# Patient Record
Sex: MALE | Race: WHITE | NOT HISPANIC OR LATINO | Employment: FULL TIME | URBAN - METROPOLITAN AREA
[De-identification: names, ages, dates, MRNs, and addresses within clinical notes are randomized per-mention and may not be internally consistent; named-entity substitution may affect disease eponyms.]

---

## 2017-02-27 ENCOUNTER — ALLSCRIPTS OFFICE VISIT (OUTPATIENT)
Dept: OTHER | Facility: OTHER | Age: 37
End: 2017-02-27

## 2018-01-14 VITALS
RESPIRATION RATE: 20 BRPM | SYSTOLIC BLOOD PRESSURE: 122 MMHG | HEIGHT: 68 IN | DIASTOLIC BLOOD PRESSURE: 72 MMHG | HEART RATE: 98 BPM | OXYGEN SATURATION: 95 % | TEMPERATURE: 99.4 F | BODY MASS INDEX: 32.79 KG/M2 | WEIGHT: 216.38 LBS

## 2018-04-11 DIAGNOSIS — E66.9 OBESITY (BMI 30.0-34.9): Primary | ICD-10-CM

## 2018-04-11 RX ORDER — PHENTERMINE HYDROCHLORIDE 37.5 MG/1
37.5 TABLET ORAL DAILY
Qty: 30 TABLET | Refills: 2 | Status: SHIPPED | OUTPATIENT
Start: 2018-04-11 | End: 2019-01-04 | Stop reason: ALTCHOICE

## 2018-04-11 RX ORDER — PHENTERMINE HYDROCHLORIDE 37.5 MG/1
1 TABLET ORAL DAILY
COMMUNITY
Start: 2016-05-04 | End: 2018-04-11 | Stop reason: SDUPTHER

## 2019-01-04 ENCOUNTER — OFFICE VISIT (OUTPATIENT)
Dept: FAMILY MEDICINE CLINIC | Facility: CLINIC | Age: 39
End: 2019-01-04
Payer: COMMERCIAL

## 2019-01-04 VITALS
HEART RATE: 92 BPM | WEIGHT: 231 LBS | RESPIRATION RATE: 16 BRPM | SYSTOLIC BLOOD PRESSURE: 150 MMHG | TEMPERATURE: 99.1 F | HEIGHT: 67 IN | BODY MASS INDEX: 36.26 KG/M2 | DIASTOLIC BLOOD PRESSURE: 100 MMHG

## 2019-01-04 DIAGNOSIS — Z13.220 SCREENING FOR HYPERLIPIDEMIA: ICD-10-CM

## 2019-01-04 DIAGNOSIS — Z13.1 SCREENING FOR DIABETES MELLITUS: ICD-10-CM

## 2019-01-04 DIAGNOSIS — Z13.0 SCREENING FOR DEFICIENCY ANEMIA: ICD-10-CM

## 2019-01-04 DIAGNOSIS — Z13.29 SCREENING FOR THYROID DISORDER: ICD-10-CM

## 2019-01-04 DIAGNOSIS — E66.9 OBESITY (BMI 30.0-34.9): Primary | ICD-10-CM

## 2019-01-04 DIAGNOSIS — E55.9 VITAMIN D DEFICIENCY: ICD-10-CM

## 2019-01-04 PROBLEM — E66.811 OBESITY (BMI 30.0-34.9): Status: ACTIVE | Noted: 2019-01-04

## 2019-01-04 PROCEDURE — 3008F BODY MASS INDEX DOCD: CPT | Performed by: PHYSICIAN ASSISTANT

## 2019-01-04 PROCEDURE — 1036F TOBACCO NON-USER: CPT | Performed by: PHYSICIAN ASSISTANT

## 2019-01-04 PROCEDURE — 99213 OFFICE O/P EST LOW 20 MIN: CPT | Performed by: PHYSICIAN ASSISTANT

## 2019-01-04 RX ORDER — PHENTERMINE HYDROCHLORIDE 37.5 MG/1
37.5 TABLET ORAL DAILY
Qty: 30 TABLET | Refills: 3 | Status: SHIPPED | OUTPATIENT
Start: 2019-01-04 | End: 2020-11-19

## 2019-01-04 NOTE — ASSESSMENT & PLAN NOTE
Will restart phentermine to assist in weight loss efforts  Pt counseled on short term use of this  Update labs

## 2019-01-04 NOTE — PROGRESS NOTES
Assessment/Plan:    Obesity (BMI 30 0-34  9)  Will restart phentermine to assist in weight loss efforts  Pt counseled on short term use of this  Update labs  Diagnoses and all orders for this visit:    Obesity (BMI 30 0-34 9)  -     phentermine (ADIPEX-P) 37 5 MG tablet; Take 1 tablet (37 5 mg total) by mouth daily    Screening for deficiency anemia  -     CBC and differential; Future    Screening for diabetes mellitus  -     Comprehensive metabolic panel; Future    Screening for hyperlipidemia  -     Lipid panel; Future    Screening for thyroid disorder  -     TSH, 3rd generation with Free T4 reflex; Future    Vitamin D deficiency  -     Vitamin D 25 hydroxy; Future          Subjective:      Patient ID: Bill Tapia is a 42 y o  male  Pt presents for routine visit  He is doing well overall  His BP is elevated and he attributes this to a recent 30lb weight gain  He is interested in restarting phentermine that he recently had success with  He is due for labs  He denies any symptoms of his elevated blood pressure such as headache, chest pain or vision change  The following portions of the patient's history were reviewed and updated as appropriate: allergies, current medications, past family history, past medical history, past social history, past surgical history and problem list     Review of Systems   Constitutional: Negative for chills and fever  HENT: Negative for congestion, ear pain, hearing loss, postnasal drip, rhinorrhea, sinus pain, sinus pressure, sore throat and trouble swallowing  Eyes: Negative for pain and visual disturbance  Respiratory: Negative for cough, chest tightness, shortness of breath and wheezing  Cardiovascular: Negative  Negative for chest pain, palpitations and leg swelling  Gastrointestinal: Negative for abdominal pain, blood in stool, constipation, diarrhea, nausea and vomiting     Endocrine: Negative for cold intolerance, heat intolerance, polydipsia, polyphagia and polyuria  Genitourinary: Negative for difficulty urinating, dysuria, flank pain and urgency  Musculoskeletal: Negative for arthralgias, back pain, gait problem and myalgias  Skin: Negative for rash  Allergic/Immunologic: Negative  Neurological: Negative for dizziness, weakness, light-headedness and headaches  Hematological: Negative  Psychiatric/Behavioral: Negative for behavioral problems, dysphoric mood and sleep disturbance  The patient is not nervous/anxious  Objective:      /100 (BP Location: Left arm, Patient Position: Sitting, Cuff Size: Large)   Pulse 92   Temp 99 1 °F (37 3 °C) (Tympanic)   Resp 16   Ht 5' 7" (1 702 m)   Wt 105 kg (231 lb)   BMI 36 18 kg/m²          Physical Exam   Constitutional: He is oriented to person, place, and time  He appears well-developed and well-nourished  No distress  HENT:   Head: Normocephalic and atraumatic  Right Ear: External ear normal    Left Ear: External ear normal    Nose: Nose normal    Mouth/Throat: Oropharynx is clear and moist  No oropharyngeal exudate  Eyes: Pupils are equal, round, and reactive to light  Conjunctivae and EOM are normal  Right eye exhibits no discharge  Left eye exhibits no discharge  No scleral icterus  Neck: Normal range of motion  Neck supple  No thyromegaly present  Cardiovascular: Normal rate, regular rhythm and normal heart sounds  Exam reveals no gallop and no friction rub  No murmur heard  Pulmonary/Chest: Effort normal and breath sounds normal  No respiratory distress  He has no wheezes  He has no rales  Abdominal: Soft  Bowel sounds are normal  He exhibits no distension  There is no tenderness  Musculoskeletal: Normal range of motion  He exhibits no edema, tenderness or deformity  Neurological: He is alert and oriented to person, place, and time  No cranial nerve deficit  Skin: Skin is warm and dry  He is not diaphoretic     Psychiatric: He has a normal mood and affect   His behavior is normal  Judgment and thought content normal

## 2019-07-24 ENCOUNTER — TELEPHONE (OUTPATIENT)
Dept: INTERNAL MEDICINE CLINIC | Facility: CLINIC | Age: 39
End: 2019-07-24

## 2020-11-19 ENCOUNTER — TELEMEDICINE (OUTPATIENT)
Dept: INTERNAL MEDICINE CLINIC | Facility: CLINIC | Age: 40
End: 2020-11-19
Payer: COMMERCIAL

## 2020-11-19 DIAGNOSIS — E55.9 VITAMIN D DEFICIENCY: ICD-10-CM

## 2020-11-19 DIAGNOSIS — Z13.29 SCREENING FOR THYROID DISORDER: ICD-10-CM

## 2020-11-19 DIAGNOSIS — Z13.220 SCREENING, LIPID: ICD-10-CM

## 2020-11-19 DIAGNOSIS — Z13.0 SCREENING FOR DEFICIENCY ANEMIA: ICD-10-CM

## 2020-11-19 DIAGNOSIS — F41.1 GAD (GENERALIZED ANXIETY DISORDER): ICD-10-CM

## 2020-11-19 DIAGNOSIS — Z13.1 SCREENING FOR DIABETES MELLITUS: ICD-10-CM

## 2020-11-19 DIAGNOSIS — I10 ESSENTIAL HYPERTENSION: Primary | ICD-10-CM

## 2020-11-19 PROCEDURE — 1036F TOBACCO NON-USER: CPT | Performed by: PHYSICIAN ASSISTANT

## 2020-11-19 PROCEDURE — 99214 OFFICE O/P EST MOD 30 MIN: CPT | Performed by: PHYSICIAN ASSISTANT

## 2020-11-19 PROCEDURE — 3725F SCREEN DEPRESSION PERFORMED: CPT | Performed by: PHYSICIAN ASSISTANT

## 2020-11-19 RX ORDER — ALPRAZOLAM 0.5 MG/1
0.5 TABLET ORAL 2 TIMES DAILY PRN
Qty: 60 TABLET | Refills: 1 | Status: SHIPPED | OUTPATIENT
Start: 2020-11-19 | End: 2021-01-18 | Stop reason: SDUPTHER

## 2020-11-19 RX ORDER — LISINOPRIL 5 MG/1
5 TABLET ORAL DAILY
Qty: 90 TABLET | Refills: 3 | Status: SHIPPED | OUTPATIENT
Start: 2020-11-19 | End: 2021-01-18 | Stop reason: SDUPTHER

## 2020-11-19 RX ORDER — LISINOPRIL 5 MG/1
5 TABLET ORAL DAILY
Qty: 90 TABLET | Refills: 3 | Status: SHIPPED | OUTPATIENT
Start: 2020-11-19 | End: 2020-11-19 | Stop reason: SDUPTHER

## 2021-01-18 ENCOUNTER — TELEPHONE (OUTPATIENT)
Dept: INTERNAL MEDICINE CLINIC | Facility: CLINIC | Age: 41
End: 2021-01-18

## 2021-01-18 ENCOUNTER — TELEMEDICINE (OUTPATIENT)
Dept: INTERNAL MEDICINE CLINIC | Facility: CLINIC | Age: 41
End: 2021-01-18
Payer: COMMERCIAL

## 2021-01-18 VITALS
TEMPERATURE: 100.4 F | SYSTOLIC BLOOD PRESSURE: 144 MMHG | HEART RATE: 85 BPM | BODY MASS INDEX: 35.31 KG/M2 | WEIGHT: 225 LBS | HEIGHT: 67 IN | DIASTOLIC BLOOD PRESSURE: 107 MMHG

## 2021-01-18 DIAGNOSIS — I10 ESSENTIAL HYPERTENSION: ICD-10-CM

## 2021-01-18 DIAGNOSIS — F41.1 GAD (GENERALIZED ANXIETY DISORDER): ICD-10-CM

## 2021-01-18 DIAGNOSIS — U07.1 COVID-19: Primary | ICD-10-CM

## 2021-01-18 PROCEDURE — 99214 OFFICE O/P EST MOD 30 MIN: CPT | Performed by: PHYSICIAN ASSISTANT

## 2021-01-18 RX ORDER — LISINOPRIL 10 MG/1
10 TABLET ORAL DAILY
Qty: 30 TABLET | Refills: 2 | Status: SHIPPED | OUTPATIENT
Start: 2021-01-18 | End: 2021-02-09

## 2021-01-18 RX ORDER — ALBUTEROL SULFATE 90 UG/1
3 AEROSOL, METERED RESPIRATORY (INHALATION) ONCE AS NEEDED
Status: CANCELLED | OUTPATIENT
Start: 2021-01-20

## 2021-01-18 RX ORDER — ACETAMINOPHEN 325 MG/1
650 TABLET ORAL ONCE AS NEEDED
Status: CANCELLED | OUTPATIENT
Start: 2021-01-20

## 2021-01-18 RX ORDER — ALPRAZOLAM 0.5 MG/1
0.5 TABLET ORAL 2 TIMES DAILY PRN
Qty: 60 TABLET | Refills: 1 | Status: SHIPPED | OUTPATIENT
Start: 2021-01-18 | End: 2021-03-15 | Stop reason: SDUPTHER

## 2021-01-18 RX ORDER — SODIUM CHLORIDE 9 MG/ML
20 INJECTION, SOLUTION INTRAVENOUS ONCE
Status: CANCELLED | OUTPATIENT
Start: 2021-01-20

## 2021-01-18 NOTE — PROGRESS NOTES
COVID-19 Virtual Visit     Assessment/Plan:    Problem List Items Addressed This Visit        Cardiovascular and Mediastinum    Essential hypertension    Relevant Medications    lisinopril (ZESTRIL) 10 mg tablet      Other Visit Diagnoses     COVID-19    -  Primary    LATIA (generalized anxiety disorder)        Relevant Medications    ALPRAZolam (XANAX) 0 5 mg tablet         Disposition:     I recommended continued isolation until at least 24 hours have passed since recovery defined as resolution of fever without the use of fever-reducing medications AND improvement in COVID symptoms AND 10 days have passed since onset of symptoms (or 10 days have passed since date of first positive viral diagnostic test for asymptomatic patients)  Pt was informed to isolate  Avoid any public places  Use one bedroom and one bathroom within the home  [place] masking recommended if they need to be around anyone they share the home with  Clean high touch surfaces frequently  Wash hands frequently  Pt verbalized understanding of this  Patient meets criteria for Casirivimab/Imdevimab infusion  They were counseled in regards to risks, benefits, and side effects of this infusion  Casirivimab and imdevimab are investigational medicines used to treat mild to moderate symptoms of COVID-19 in non-hospitalized adults and adolescents (15years of age and older who weigh at least 80 pounds (40 kg)), and who are at high risk for developing severe COVID-19 symptoms or the need for hospitalization  Casirivimab and imdevimab are investigational because they are still being studied  There is limited information known about the safety and effectiveness of using casirivimab and imdevimab to treat people with COVID-19  The FDA has authorized the emergency use of casirivimab and imdevimab for the treatment of COVID-19 under an Emergency Use Authorization (EUA)  Possible side effects of casirivimab and imdevimab:     Allergic reactions can happen during and after infusion with casirivimab and imdevimab  Possible reactions include: fever, chills, nausea, headache, shortness of breath, low blood pressure, wheezing, swelling of your lips, face, or throat, rash including hives, itching, muscle aches, and dizziness  The side effects of getting any medicine by vein may include brief pain, bleeding, bruising of the skin, soreness, swelling, and possible infection at the infusion site  These are not all the possible side effects of casirivimab and imdevimab  Not a lot of people have been given casirivimab and imdevimab  Serious and unexpected side effects may happen  Casirivimab and imdevimab are still being studied so it is possible that all of the risks are not known at this time  It is possible that casirivimab and imdevimab could interfere with your body's own ability to fight off a future infection of SARS-CoV-2  Similarly, casirivimab and imdevimab may reduce your body's immune response to a vaccine for SARS-CoV-2  Specific studies have not been conducted to address these possible risks  Emergency Use Authorization:    The [facility] FDA has made casirivimab and imdevimab available under an emergency access mechanism called an EUA  The EUA is supported by a [place] of Health and Human Service (HHS) declaration that circumstances exist to justify the emergency use of drugs and biological products during the COVID-19 pandemic  Casirivimab and imdevimab have not undergone the same type of review as an FDA-approved or cleared product  The FDA may issue an EUA when certain criteria are met, which includes that there are no adequate, approved, available alternatives   In addition, the FDA decision is based on the totality of scientific evidence available showing that it is reasonable to believe that the product meets certain criteria for safety, performance, and labeling and may be effective in treatment of patients during the COVID-19 pandemic  All of these criteria must be met to allow for the product to be used in the treatment of patients during the COVID-19 pandemic  The EUA for casirivimab and imdevimab is in effect for the duration of the COVID-19 declaration justifying emergency use of these products, unless terminated or revoked (after which the products may no longer be used)  Regarding COVID-19 Vaccination:    Currently there is no data or safety or efficacy of COVID-19 vaccination in persons who received monoclonal antibodies as part of COVID-19 treatment  Treatment should be deferred for at least 90 days to avoid interference of the treatment with vaccine-induced immune responses (this is based on estimated half-life of therapies and evidence suggesting reinfection is uncommon within 90 days of initial infection)  The patient consents to proceed with casirivimab and imdevimab infusion  I have spent 15 minutes directly with the patient  Greater than 50% of this time was spent in counseling/coordination of care regarding: prognosis, risks and benefits of treatment options, instructions for management, patient and family education, importance of treatment compliance, risk factor reductions and impressions  Encounter provider Ludmila Rivera PA-C    Provider located at [address] [place] [phone]    Recent Visits  No visits were found meeting these conditions  Showing recent visits within past 7 days and meeting all other requirements     Today's Visits  Date Type Provider Dept   01/18/21 Telemedicine [facility] today's visits and meeting all other requirements     Future Appointments  No visits were found meeting these conditions     Showing future appointments within next 150 days and meeting all other requirements      This virtual check-in was done via [employer] and patient was informed that this is a secure, HIPAA-compliant platform  He agrees to proceed  Patient agrees to participate in a virtual check in via telephone or video visit instead of presenting to the office to address urgent/immediate medical needs  Patient is aware this is a billable service  After connecting through [facility], the patient was identified by name and date of birth  Omaira Banks was informed that this was a telemedicine visit and that the exam was being conducted confidentially over secure lines  Omaira Banks acknowledged consent and understanding of privacy and security of the telemedicine visit  I informed the patient that I have reviewed his record in Epic and presented the opportunity for him to ask any questions regarding the visit today  The patient agreed to participate  Subjective:   Omaira Banks is a 46 y o  male who has been screened for COVID-19  Symptom change since last report: unchanged  Patient's symptoms include fever, chills, nasal congestion, cough and myalgias  Patient denies rhinorrhea, sore throat, shortness of breath, chest tightness, abdominal pain, nausea, vomiting, diarrhea and headaches  Pawan Hough has been staying home and has isolated themselves in his home  He is taking care to not share personal items and is cleaning all surfaces that are touched often, like counters, tabletops, and doorknobs using household cleaning sprays or wipes  He is wearing a mask when he leaves his room       Date of symptom onset: 1/18/2021  Date of positive COVID-19 PCR: 1/18/2021    No results found for: Coleman V[place]la, [facility]  Past Medical History:   Diagnosis Date    Hypertension      Past Surgical History:   Procedure Laterality Date    NEUROPLASTY / TRANSPOSITION MEDIAN NERVE AT CARPAL TUNNEL      SHOULDER ARTHROPLASTY Right 10/2016     Current Outpatient Medications   Medication Sig Dispense Refill    ALPRAZolam (XANAX) 0 5 mg tablet Take 1 tablet (0 5 mg total) by mouth 2 (two) times a day as needed for anxiety 60 tablet 1    lisinopril (ZESTRIL) 10 mg tablet Take 1 tablet (10 mg total) by mouth daily 30 tablet 2     No current facility-administered medications for this visit  No Known Allergies    Review of Systems   Constitutional: Positive for chills and fever  HENT: Positive for congestion  Negative for ear pain, hearing loss, postnasal drip, rhinorrhea, sinus pressure, sinus pain, sore throat and trouble swallowing  Eyes: Negative for pain and visual disturbance  Respiratory: Positive for cough  Negative for chest tightness, shortness of breath and wheezing  Cardiovascular: Negative  Negative for chest pain, palpitations and leg swelling  Gastrointestinal: Negative for abdominal pain, blood in stool, constipation, diarrhea, nausea and vomiting  Endocrine: Negative for cold intolerance, heat intolerance, polydipsia, polyphagia and polyuria  Genitourinary: Negative for difficulty urinating, dysuria, flank pain and urgency  Musculoskeletal: Positive for myalgias  Negative for arthralgias, back pain and gait problem  Skin: Negative for rash  Allergic/Immunologic: Negative  Neurological: Negative for dizziness, weakness, light-headedness and headaches  Hematological: Negative  Psychiatric/Behavioral: Negative for behavioral problems, dysphoric mood and sleep disturbance  The patient is not nervous/anxious  Objective:    Vitals:    01/18/21 1316   BP: (!) 144/107   Pulse: 85   Temp: 100 4 °F (38 °C)   Weight: 102 kg (225 lb)   Height: 5' 7" (1 702 m)       Physical Exam  Constitutional:       Appearance: He is well-developed  He is ill-appearing  HENT:      Head: Normocephalic and atraumatic  Nose: Nose normal    Eyes:      Conjunctiva/sclera: Conjunctivae normal    Neck:      Musculoskeletal: Normal range of motion  Pulmonary:      Effort: Pulmonary effort is normal    Musculoskeletal: Normal range of motion  Neurological:      Mental Status: He is alert and oriented to person, place, and time  Psychiatric:         Behavior: Behavior normal          Thought Content: Thought content normal          Judgment: Judgment normal        VIRTUAL VISIT DISCLAIMER    Diana Chacorta acknowledges that he has consented to an online visit or consultation  He understands that the online visit is based solely on information provided by him, and that, in the absence of a face-to-face physical evaluation by the physician, the diagnosis he receives is both limited and provisional in terms of accuracy and completeness  This is not intended to replace a full medical face-to-face evaluation by the physician  Diana Whatley understands and accepts these terms

## 2021-01-20 ENCOUNTER — TELEMEDICINE (OUTPATIENT)
Dept: INTERNAL MEDICINE CLINIC | Facility: CLINIC | Age: 41
End: 2021-01-20
Payer: COMMERCIAL

## 2021-01-20 ENCOUNTER — HOSPITAL ENCOUNTER (OUTPATIENT)
Dept: INFUSION CENTER | Facility: HOSPITAL | Age: 41
Discharge: HOME | End: 2021-01-20
Payer: COMMERCIAL

## 2021-01-20 VITALS
HEIGHT: 67 IN | TEMPERATURE: 98.4 F | WEIGHT: 225 LBS | SYSTOLIC BLOOD PRESSURE: 126 MMHG | DIASTOLIC BLOOD PRESSURE: 86 MMHG | HEART RATE: 96 BPM | BODY MASS INDEX: 35.31 KG/M2

## 2021-01-20 VITALS
DIASTOLIC BLOOD PRESSURE: 80 MMHG | SYSTOLIC BLOOD PRESSURE: 125 MMHG | OXYGEN SATURATION: 96 % | TEMPERATURE: 98.2 F | HEART RATE: 74 BPM | RESPIRATION RATE: 18 BRPM

## 2021-01-20 DIAGNOSIS — U07.1 COVID-19: Primary | ICD-10-CM

## 2021-01-20 PROCEDURE — M0243 CASIRIVI AND IMDEVI INFUSION: HCPCS

## 2021-01-20 PROCEDURE — 99213 OFFICE O/P EST LOW 20 MIN: CPT | Performed by: PHYSICIAN ASSISTANT

## 2021-01-20 RX ORDER — ACETAMINOPHEN 325 MG/1
650 TABLET ORAL ONCE AS NEEDED
Status: DISCONTINUED | OUTPATIENT
Start: 2021-01-20 | End: 2021-01-23 | Stop reason: HOSPADM

## 2021-01-20 RX ORDER — ACETAMINOPHEN 325 MG/1
650 TABLET ORAL ONCE AS NEEDED
Status: CANCELLED | OUTPATIENT
Start: 2021-01-20

## 2021-01-20 RX ORDER — SODIUM CHLORIDE 9 MG/ML
20 INJECTION, SOLUTION INTRAVENOUS ONCE
Status: CANCELLED | OUTPATIENT
Start: 2021-01-20

## 2021-01-20 RX ORDER — SODIUM CHLORIDE 9 MG/ML
20 INJECTION, SOLUTION INTRAVENOUS ONCE
Status: COMPLETED | OUTPATIENT
Start: 2021-01-20 | End: 2021-01-20

## 2021-01-20 RX ORDER — ALBUTEROL SULFATE 90 UG/1
3 AEROSOL, METERED RESPIRATORY (INHALATION) ONCE AS NEEDED
Status: CANCELLED | OUTPATIENT
Start: 2021-01-20

## 2021-01-20 RX ORDER — ALBUTEROL SULFATE 90 UG/1
3 AEROSOL, METERED RESPIRATORY (INHALATION) ONCE AS NEEDED
Status: DISCONTINUED | OUTPATIENT
Start: 2021-01-20 | End: 2021-01-23 | Stop reason: HOSPADM

## 2021-01-20 RX ADMIN — SODIUM CHLORIDE 20 ML/HR: 9 INJECTION, SOLUTION INTRAVENOUS at 11:00

## 2021-01-20 RX ADMIN — IMDEVIMAB: 1332 INJECTION, SOLUTION, CONCENTRATE INTRAVENOUS at 11:15

## 2021-01-20 NOTE — PROGRESS NOTES
Patient tolerated Regeneron (Carsirivimab and Imdevimab) infusion and 1 hour post observation without incident  IV discontinued, catheter intact  Gauze applied to site  Discharge instructions reviewed with patient verbally  Copy of discharge instructions given to patient  Patient verbalized understanding of all discharge instructions  Patient discharged without incident

## 2021-01-20 NOTE — PROGRESS NOTES
COVID-19 Virtual Visit     Assessment/Plan:    Problem List Items Addressed This Visit        Other    COVID-19 - Primary         Disposition:     I recommended continued isolation until at least 24 hours have passed since recovery defined as resolution of fever without the use of fever-reducing medications AND improvement in COVID symptoms AND 10 days have passed since onset of symptoms (or 10 days have passed since date of first positive viral diagnostic test for asymptomatic patients)  I have spent 15 minutes directly with the patient  Greater than 50% of this time was spent in counseling/coordination of care regarding: prognosis, risks and benefits of treatment options, instructions for management, patient and family education, importance of treatment compliance, risk factor reductions and impressions  Encounter provider Dalia Sagastume PA-C    Provider located at [address] [place] [phone]    Recent Visits  Date Type Provider Dept   01/18/21 Telephone Jose Rafael [place], [facility]   01/18/21 [facility]   Showing recent visits within past 7 days and meeting all other requirements     Today's Visits  Date Type Provider Dept   01/20/21 Telemedicine Mica Verduzco PA-C [facility] today's visits and meeting all other requirements     Future Appointments  No visits were found meeting these conditions  Showing future appointments within next 150 days and meeting all other requirements      This virtual check-in was done via [employer] and patient was informed that this is a secure, HIPAA-compliant platform  He agrees to proceed  Patient agrees to participate in a virtual check in via telephone or video visit instead of presenting to the office to address urgent/immediate medical needs  Patient is aware this is a billable service      After connecting through [employer], the patient was identified by name and date of birth  Evan Jones was informed that this was a telemedicine visit and that the exam was being conducted confidentially over secure lines  Evan Jones acknowledged consent and understanding of privacy and security of the telemedicine visit  I informed the patient that I have reviewed his record in Epic and presented the opportunity for him to ask any questions regarding the visit today  The patient agreed to participate  Subjective:   Evan Jones is a 46 y o  male who has been screened for COVID-19  Symptom change since last report: unchanged  Patient's symptoms include nasal congestion, anosmia and cough  Patient denies fever, chills, rhinorrhea, sore throat, shortness of breath, chest tightness, abdominal pain, nausea, vomiting, diarrhea, myalgias and headaches  Michelle Carpio has been staying home and has isolated themselves in his home  He is taking care to not share personal items and is cleaning all surfaces that are touched often, like counters, tabletops, and doorknobs using household cleaning sprays or wipes  He is wearing a mask when he leaves his room       Date of symptom onset: 1/18/2021  Date of positive COVID-19 PCR: 1/18/2021    Monoclonal Antibody Follow-up Symptom Questionnaire  I feel overall: somewhat better  My breathing is: somewhat better  My fever is: better  My fatigue is: somewhat better    No results found for: SARSCOV2, [address] [facility], SARSCORONAVI, CORONAVIRUSR  Past Medical History:   Diagnosis Date    Hypertension      Past Surgical History:   Procedure Laterality Date    NEUROPLASTY / TRANSPOSITION MEDIAN NERVE AT CARPAL TUNNEL      SHOULDER ARTHROPLASTY Right 10/2016     Current Outpatient Medications   Medication Sig Dispense Refill    ALPRAZolam (XANAX) 0 5 mg tablet Take 1 tablet (0 5 mg total) by mouth 2 (two) times a day as needed for anxiety 60 tablet 1    lisinopril (ZESTRIL) 10 mg tablet Take 1 tablet (10 mg total) by mouth daily 30 tablet 2     No current facility-administered medications for this visit  Facility-Administered Medications Ordered in Other Visits   Medication Dose Route Frequency Provider Last Rate Last Admin    acetaminophen (TYLENOL) tablet 650 mg  650 mg Oral Once PRN Donnald Lux, DO        albuterol (PROVENTIL HFA,VENTOLIN HFA) inhaler 3 puff  3 puff Inhalation Once PRN Donnald Lux, DO         No Known Allergies    Review of Systems   Constitutional: Negative for chills and fever  HENT: Positive for congestion  Negative for ear pain, hearing loss, postnasal drip, rhinorrhea, sinus pressure, sinus pain, sore throat and trouble swallowing  Eyes: Negative for pain and visual disturbance  Respiratory: Positive for cough  Negative for chest tightness, shortness of breath and wheezing  Cardiovascular: Negative  Negative for chest pain, palpitations and leg swelling  Gastrointestinal: Negative for abdominal pain, blood in stool, constipation, diarrhea, nausea and vomiting  Endocrine: Negative for cold intolerance, heat intolerance, polydipsia, polyphagia and polyuria  Genitourinary: Negative for difficulty urinating, dysuria, flank pain and urgency  Musculoskeletal: Negative for arthralgias, back pain, gait problem and myalgias  Skin: Negative for rash  Allergic/Immunologic: Negative  Neurological: Negative for dizziness, weakness, light-headedness and headaches  Hematological: Negative  Psychiatric/Behavioral: Negative for behavioral problems, dysphoric mood and sleep disturbance  The patient is not nervous/anxious  Objective:    Vitals:    01/20/21 1524   BP: 126/86   Pulse: 96   Temp: 98 4 °F (36 9 °C)   Weight: 102 kg (225 lb)   Height: 5' 7" (1 702 m)       Physical Exam  Constitutional:       Appearance: He is well-developed  HENT:      Head: Normocephalic and atraumatic        Nose: Nose normal    Eyes:      Conjunctiva/sclera: Conjunctivae normal    Neck:      Musculoskeletal: Normal range of motion  Pulmonary:      Effort: Pulmonary effort is normal    Musculoskeletal: Normal range of motion  Neurological:      Mental Status: He is alert and oriented to person, place, and time  Psychiatric:         Behavior: Behavior normal          Thought Content: Thought content normal          Judgment: Judgment normal        VIRTUAL VISIT DISCLAIMER    Diana Chacorta acknowledges that he has consented to an online visit or consultation  He understands that the online visit is based solely on information provided by him, and that, in the absence of a face-to-face physical evaluation by the physician, the diagnosis he receives is both limited and provisional in terms of accuracy and completeness  This is not intended to replace a full medical face-to-face evaluation by the physician  Diana Whatley understands and accepts these terms

## 2021-01-20 NOTE — PROGRESS NOTES
Patient is here today for their Bamlanivimab infusion  Reviewed EUA with patient  Patient verbalized understanding of EUA  Copy of EUA given to patient  All questions answered to patients satisfaction  IV started, good blood return, flushes freely  Patient tolerated well

## 2021-01-22 ENCOUNTER — TELEMEDICINE (OUTPATIENT)
Dept: INTERNAL MEDICINE CLINIC | Facility: CLINIC | Age: 41
End: 2021-01-22
Payer: COMMERCIAL

## 2021-01-22 VITALS — HEART RATE: 84 BPM | OXYGEN SATURATION: 98 % | TEMPERATURE: 98.4 F

## 2021-01-22 DIAGNOSIS — U07.1 COVID-19: Primary | ICD-10-CM

## 2021-01-22 PROCEDURE — 99213 OFFICE O/P EST LOW 20 MIN: CPT | Performed by: PHYSICIAN ASSISTANT

## 2021-01-22 NOTE — PROGRESS NOTES
COVID-19 Virtual Visit     Assessment/Plan:    Problem List Items Addressed This Visit        Other    COVID-19 - Primary         Disposition:     I recommended continued isolation until at least 24 hours have passed since recovery defined as resolution of fever without the use of fever-reducing medications AND improvement in COVID symptoms AND 10 days have passed since onset of symptoms (or 10 days have passed since date of first positive viral diagnostic test for asymptomatic patients)  I have spent 15 minutes directly with the patient  Greater than 50% of this time was spent in counseling/coordination of care regarding: prognosis, risks and benefits of treatment options, instructions for management, patient and family education, importance of treatment compliance, risk factor reductions and impressions  Encounter provider Cherelle Martell PA-C    Provider located at [address] [place] [phone]    Recent Visits  Date Type Provider Dept   01/20/21 Telemedicine Cherelle Martell PA-C Pg [facility]   01/18/21 Telephone Vikas Riojas, [address] [place] [address] [facility]   01/18/21 Telemedicine Mica Verduzco PA-C [facility]   Showing recent visits within past 7 days and meeting all other requirements     Today's Visits  Date Type Provider Dept   01/22/21 Telemedicine LUL Pearson Pg today's visits and meeting all other requirements     Future Appointments  No visits were found meeting these conditions  Showing future appointments within next 150 days and meeting all other requirements      This virtual check-in was done via [employer] and patient was informed that this is a secure, HIPAA-compliant platform  He agrees to proceed  Patient agrees to participate in a virtual check in via telephone or video visit instead of presenting to the office to address urgent/immediate medical needs  Patient is aware this is a billable service  After connecting through [facility], the patient was identified by name and date of birth  Drucella Gilford was informed that this was a telemedicine visit and that the exam was being conducted confidentially over secure lines  Drucella Gilford acknowledged consent and understanding of privacy and security of the telemedicine visit  I informed the patient that I have reviewed his record in Epic and presented the opportunity for him to ask any questions regarding the visit today  The patient agreed to participate  Subjective:   Drucella Gilford is a 46 y o  male who has been screened for COVID-19  Symptom change since last report: improving  Patient's symptoms include cough  Patient denies fever, chills, fatigue, congestion, rhinorrhea, sore throat, anosmia, loss of taste, shortness of breath, chest tightness, abdominal pain, nausea, vomiting, diarrhea, myalgias and headaches  Aden Wesley has been staying home and has isolated themselves in his home  He is taking care to not share personal items and is cleaning all surfaces that are touched often, like counters, tabletops, and doorknobs using household cleaning sprays or wipes  He is wearing a mask when he leaves his room       Date of symptom onset: 1/18/2021    Monoclonal Antibody Follow-up Symptom Questionnaire  I feel overall: much better  My breathing is: much better  My fever is: better  My fatigue is: much better    No results found for: [place] [address], [address] [facility]  Past Medical History:   Diagnosis Date    Hypertension      Past Surgical History:   Procedure Laterality Date    NEUROPLASTY / TRANSPOSITION MEDIAN NERVE AT CARPAL TUNNEL      SHOULDER ARTHROPLASTY Right 10/2016     Current Outpatient Medications   Medication Sig Dispense Refill    ALPRAZolam (XANAX) 0 5 mg tablet Take 1 tablet (0 5 mg total) by mouth 2 (two) times a day as needed for anxiety 60 tablet 1    lisinopril (ZESTRIL) 10 mg tablet Take 1 tablet (10 mg total) by mouth daily 30 tablet 2     No current facility-administered medications for this visit  Facility-Administered Medications Ordered in Other Visits   Medication Dose Route Frequency Provider Last Rate Last Admin    acetaminophen (TYLENOL) tablet 650 mg  650 mg Oral Once PRN Nicky Lobo,         albuterol (PROVENTIL HFA,VENTOLIN HFA) inhaler 3 puff  3 puff Inhalation Once PRN Nicky Lobo, DO         No Known Allergies    Review of Systems   Constitutional: Negative for chills, fatigue and fever  HENT: Negative for congestion, ear pain, hearing loss, postnasal drip, rhinorrhea, sinus pressure, sinus pain, sore throat and trouble swallowing  Eyes: Negative for pain and visual disturbance  Respiratory: Positive for cough  Negative for chest tightness, shortness of breath and wheezing  Cardiovascular: Negative  Negative for chest pain, palpitations and leg swelling  Gastrointestinal: Negative for abdominal pain, blood in stool, constipation, diarrhea, nausea and vomiting  Endocrine: Negative for cold intolerance, heat intolerance, polydipsia, polyphagia and polyuria  Genitourinary: Negative for difficulty urinating, dysuria, flank pain and urgency  Musculoskeletal: Negative for arthralgias, back pain, gait problem and myalgias  Skin: Negative for rash  Allergic/Immunologic: Negative  Neurological: Negative for dizziness, weakness, light-headedness and headaches  Hematological: Negative  Psychiatric/Behavioral: Negative for behavioral problems, dysphoric mood and sleep disturbance  The patient is not nervous/anxious  Objective:    Vitals:    01/22/21 1522   Pulse: 84   Temp: 98 4 °F (36 9 °C)   SpO2: 98%       Physical Exam  Constitutional:       Appearance: He is well-developed  HENT:      Head: Normocephalic and atraumatic        Nose: Nose normal    Eyes:      Conjunctiva/sclera: Conjunctivae normal    Neck: Musculoskeletal: Normal range of motion  Pulmonary:      Effort: Pulmonary effort is normal    Musculoskeletal: Normal range of motion  Neurological:      Mental Status: He is alert and oriented to person, place, and time  Psychiatric:         Behavior: Behavior normal          Thought Content: Thought content normal          Judgment: Judgment normal        VIRTUAL VISIT DISCLAIMER    Jef Franklin acknowledges that he has consented to an online visit or consultation  He understands that the online visit is based solely on information provided by him, and that, in the absence of a face-to-face physical evaluation by the physician, the diagnosis he receives is both limited and provisional in terms of accuracy and completeness  This is not intended to replace a full medical face-to-face evaluation by the physician  Jef Franklin understands and accepts these terms

## 2021-01-25 ENCOUNTER — TELEMEDICINE (OUTPATIENT)
Dept: INTERNAL MEDICINE CLINIC | Facility: CLINIC | Age: 41
End: 2021-01-25
Payer: COMMERCIAL

## 2021-01-25 VITALS
BODY MASS INDEX: 35.31 KG/M2 | TEMPERATURE: 98.2 F | DIASTOLIC BLOOD PRESSURE: 79 MMHG | OXYGEN SATURATION: 98 % | HEIGHT: 67 IN | SYSTOLIC BLOOD PRESSURE: 126 MMHG | WEIGHT: 225 LBS

## 2021-01-25 DIAGNOSIS — U07.1 COVID-19: Primary | ICD-10-CM

## 2021-01-25 PROCEDURE — 99213 OFFICE O/P EST LOW 20 MIN: CPT | Performed by: PHYSICIAN ASSISTANT

## 2021-01-25 PROCEDURE — 3074F SYST BP LT 130 MM HG: CPT | Performed by: PHYSICIAN ASSISTANT

## 2021-01-25 PROCEDURE — 3078F DIAST BP <80 MM HG: CPT | Performed by: PHYSICIAN ASSISTANT

## 2021-01-25 PROCEDURE — 1036F TOBACCO NON-USER: CPT | Performed by: PHYSICIAN ASSISTANT

## 2021-01-25 PROCEDURE — 3008F BODY MASS INDEX DOCD: CPT | Performed by: PHYSICIAN ASSISTANT

## 2021-01-25 NOTE — PROGRESS NOTES
COVID-19 Virtual Visit     Assessment/Plan:    Problem List Items Addressed This Visit        Other    COVID-19 - Primary         Disposition:     I recommended continued isolation until at least 24 hours have passed since recovery defined as resolution of fever without the use of fever-reducing medications AND improvement in COVID symptoms AND 10 days have passed since onset of symptoms (or 10 days have passed since date of first positive viral diagnostic test for asymptomatic patients)  I have spent 15 minutes directly with the patient  Greater than 50% of this time was spent in counseling/coordination of care regarding: prognosis, risks and benefits of treatment options, instructions for management, patient and family education, importance of treatment compliance and risk factor reductions  Encounter provider Ilya Martinez PA-C    Provider located at [address] [place] [phone]    Recent Visits  Date Type Provider Dept   01/22/21 Telemedicine Ilya Martinez PA-C Pg [facility]   01/20/21 Telemedicine Mica Verduzco PA-C Pg [facility]   01/18/21 Telephone Maritza Goldberg, [address] [place] [address] [facility]   01/18/21 Telemedicine Mica Verduzco PA-C [facility]   Showing recent visits within past 7 days and meeting all other requirements     Today's Visits  Date Type Provider Dept   01/25/21 Telemedicine LUL Pearson Pg Ciara today's visits and meeting all other requirements     Future Appointments  No visits were found meeting these conditions  Showing future appointments within next 150 days and meeting all other requirements      This virtual check-in was done via [employer] and patient was informed that this is a secure, HIPAA-compliant platform  He agrees to proceed      Patient agrees to participate in a virtual check in via telephone or video visit instead of presenting to the office to address urgent/immediate medical needs  Patient is aware this is a billable service  After connecting through [facility], the patient was identified by name and date of birth  Jacques Basurto was informed that this was a telemedicine visit and that the exam was being conducted confidentially over secure lines  Jacques Basurto acknowledged consent and understanding of privacy and security of the telemedicine visit  I informed the patient that I have reviewed his record in Epic and presented the opportunity for him to ask any questions regarding the visit today  The patient agreed to participate  Subjective:   Jacques Basurto is a 46 y o  male who has been screened for COVID-19  Symptom change since last report: resolving  Patient's symptoms include anosmia and cough  Patient denies fever, chills, fatigue, congestion, rhinorrhea, sore throat, loss of taste, shortness of breath, chest tightness, abdominal pain, nausea, vomiting, diarrhea, myalgias and headaches  Ba Rashid has been staying home and has isolated themselves in his home  He is taking care to not share personal items and is cleaning all surfaces that are touched often, like counters, tabletops, and doorknobs using household cleaning sprays or wipes  He is wearing a mask when he leaves his room  Date of symptom onset: 1/18/2021    Monoclonal Antibody Follow-up Symptom Questionnaire  I feel overall: much better  My breathing is: much better  My fever is: better  My fatigue is: much better    Pt is doing much better  Only lingering symptoms are mild cough and loss of smell  He has been afebrile  He defers further virtual visits due to improvement in symptoms and will call if anything changes       No results found for: Cony Iglesias, [address] [facility], [place] [address]  Past Medical History:   Diagnosis Date    Hypertension      Past Surgical History:   Procedure Laterality Date    NEUROPLASTY / TRANSPOSITION MEDIAN NERVE AT CARPAL TUNNEL      SHOULDER ARTHROPLASTY Right 10/2016     Current Outpatient Medications   Medication Sig Dispense Refill    ALPRAZolam (XANAX) 0 5 mg tablet Take 1 tablet (0 5 mg total) by mouth 2 (two) times a day as needed for anxiety 60 tablet 1    lisinopril (ZESTRIL) 10 mg tablet Take 1 tablet (10 mg total) by mouth daily 30 tablet 2     No current facility-administered medications for this visit  No Known Allergies    Review of Systems   Constitutional: Negative for chills, fatigue and fever  HENT: Negative for congestion, ear pain, hearing loss, postnasal drip, rhinorrhea, sinus pressure, sinus pain, sore throat and trouble swallowing  Eyes: Negative for pain and visual disturbance  Respiratory: Positive for cough  Negative for chest tightness, shortness of breath and wheezing  Cardiovascular: Negative  Negative for chest pain, palpitations and leg swelling  Gastrointestinal: Negative for abdominal pain, blood in stool, constipation, diarrhea, nausea and vomiting  Endocrine: Negative for cold intolerance, heat intolerance, polydipsia, polyphagia and polyuria  Genitourinary: Negative for difficulty urinating, dysuria, flank pain and urgency  Musculoskeletal: Negative for arthralgias, back pain, gait problem and myalgias  Skin: Negative for rash  Allergic/Immunologic: Negative  Neurological: Negative for dizziness, weakness, light-headedness and headaches  Hematological: Negative  Psychiatric/Behavioral: Negative for behavioral problems, dysphoric mood and sleep disturbance  The patient is not nervous/anxious  Objective:    Vitals:    01/25/21 1510   BP: 126/79   Temp: 98 2 °F (36 8 °C)   SpO2: 98%   Weight: 102 kg (225 lb)   Height: 5' 7" (1 702 m)       Physical Exam  Constitutional:       Appearance: He is well-developed  HENT:      Head: Normocephalic and atraumatic        Nose: Nose normal    Eyes:      Conjunctiva/sclera: Conjunctivae normal    Neck: Musculoskeletal: Normal range of motion  Pulmonary:      Effort: Pulmonary effort is normal    Musculoskeletal: Normal range of motion  Neurological:      Mental Status: He is alert and oriented to person, place, and time  Psychiatric:         Behavior: Behavior normal          Thought Content: Thought content normal          Judgment: Judgment normal        VIRTUAL VISIT DISCLAIMER    Stefani Saunders acknowledges that he has consented to an online visit or consultation  He understands that the online visit is based solely on information provided by him, and that, in the absence of a face-to-face physical evaluation by the physician, the diagnosis he receives is both limited and provisional in terms of accuracy and completeness  This is not intended to replace a full medical face-to-face evaluation by the physician  Stefani Saunders understands and accepts these terms

## 2021-02-09 DIAGNOSIS — I10 ESSENTIAL HYPERTENSION: ICD-10-CM

## 2021-02-09 RX ORDER — LISINOPRIL 10 MG/1
TABLET ORAL
Qty: 30 TABLET | Refills: 5 | Status: SHIPPED | OUTPATIENT
Start: 2021-02-09 | End: 2021-08-23

## 2021-02-17 DIAGNOSIS — J02.0 STREP PHARYNGITIS: Primary | ICD-10-CM

## 2021-02-17 RX ORDER — AMOXICILLIN AND CLAVULANATE POTASSIUM 875; 125 MG/1; MG/1
1 TABLET, FILM COATED ORAL EVERY 12 HOURS SCHEDULED
Qty: 14 TABLET | Refills: 0 | Status: SHIPPED | OUTPATIENT
Start: 2021-02-17 | End: 2021-02-24

## 2021-03-15 ENCOUNTER — TELEPHONE (OUTPATIENT)
Dept: INTERNAL MEDICINE CLINIC | Facility: CLINIC | Age: 41
End: 2021-03-15

## 2021-03-15 DIAGNOSIS — F41.1 GAD (GENERALIZED ANXIETY DISORDER): ICD-10-CM

## 2021-03-15 RX ORDER — ALPRAZOLAM 0.5 MG/1
0.5 TABLET ORAL 2 TIMES DAILY PRN
Qty: 60 TABLET | Refills: 1 | Status: SHIPPED | OUTPATIENT
Start: 2021-03-15 | End: 2021-06-25 | Stop reason: SDUPTHER

## 2021-03-30 ENCOUNTER — APPOINTMENT (OUTPATIENT)
Dept: LAB | Facility: CLINIC | Age: 41
End: 2021-03-30
Payer: COMMERCIAL

## 2021-03-30 ENCOUNTER — TRANSCRIBE ORDERS (OUTPATIENT)
Dept: LAB | Facility: CLINIC | Age: 41
End: 2021-03-30

## 2021-03-30 DIAGNOSIS — E55.9 VITAMIN D DEFICIENCY: ICD-10-CM

## 2021-03-30 DIAGNOSIS — Z13.220 SCREENING, LIPID: ICD-10-CM

## 2021-03-30 DIAGNOSIS — Z13.0 SCREENING FOR DEFICIENCY ANEMIA: ICD-10-CM

## 2021-03-30 DIAGNOSIS — Z13.29 SCREENING FOR THYROID DISORDER: ICD-10-CM

## 2021-03-30 DIAGNOSIS — Z13.1 SCREENING FOR DIABETES MELLITUS: ICD-10-CM

## 2021-03-30 LAB
25(OH)D3 SERPL-MCNC: 24.2 NG/ML (ref 30–100)
ALBUMIN SERPL BCP-MCNC: 4.4 G/DL (ref 3.5–5)
ALP SERPL-CCNC: 52 U/L (ref 46–116)
ALT SERPL W P-5'-P-CCNC: 46 U/L (ref 12–78)
ANION GAP SERPL CALCULATED.3IONS-SCNC: 4 MMOL/L (ref 4–13)
AST SERPL W P-5'-P-CCNC: 23 U/L (ref 5–45)
BASOPHILS # BLD AUTO: 0.03 THOUSANDS/ΜL (ref 0–0.1)
BASOPHILS NFR BLD AUTO: 1 % (ref 0–1)
BILIRUB SERPL-MCNC: 0.93 MG/DL (ref 0.2–1)
BUN SERPL-MCNC: 19 MG/DL (ref 5–25)
CALCIUM SERPL-MCNC: 9.2 MG/DL (ref 8.3–10.1)
CHLORIDE SERPL-SCNC: 105 MMOL/L (ref 100–108)
CHOLEST SERPL-MCNC: 199 MG/DL (ref 50–200)
CO2 SERPL-SCNC: 27 MMOL/L (ref 21–32)
CREAT SERPL-MCNC: 1.17 MG/DL (ref 0.6–1.3)
EOSINOPHIL # BLD AUTO: 0.14 THOUSAND/ΜL (ref 0–0.61)
EOSINOPHIL NFR BLD AUTO: 3 % (ref 0–6)
ERYTHROCYTE [DISTWIDTH] IN BLOOD BY AUTOMATED COUNT: 13 % (ref 11.6–15.1)
EST. AVERAGE GLUCOSE BLD GHB EST-MCNC: 105 MG/DL
GFR SERPL CREATININE-BSD FRML MDRD: 78 ML/MIN/1.73SQ M
GLUCOSE P FAST SERPL-MCNC: 97 MG/DL (ref 65–99)
HBA1C MFR BLD: 5.3 %
HCT VFR BLD AUTO: 48.6 % (ref 36.5–49.3)
HDLC SERPL-MCNC: 41 MG/DL
HGB BLD-MCNC: 16 G/DL (ref 12–17)
IMM GRANULOCYTES # BLD AUTO: 0.01 THOUSAND/UL (ref 0–0.2)
IMM GRANULOCYTES NFR BLD AUTO: 0 % (ref 0–2)
LDLC SERPL CALC-MCNC: 144 MG/DL (ref 0–100)
LYMPHOCYTES # BLD AUTO: 1.22 THOUSANDS/ΜL (ref 0.6–4.47)
LYMPHOCYTES NFR BLD AUTO: 26 % (ref 14–44)
MCH RBC QN AUTO: 29.3 PG (ref 26.8–34.3)
MCHC RBC AUTO-ENTMCNC: 32.9 G/DL (ref 31.4–37.4)
MCV RBC AUTO: 89 FL (ref 82–98)
MONOCYTES # BLD AUTO: 0.48 THOUSAND/ΜL (ref 0.17–1.22)
MONOCYTES NFR BLD AUTO: 10 % (ref 4–12)
NEUTROPHILS # BLD AUTO: 2.81 THOUSANDS/ΜL (ref 1.85–7.62)
NEUTS SEG NFR BLD AUTO: 60 % (ref 43–75)
NONHDLC SERPL-MCNC: 158 MG/DL
NRBC BLD AUTO-RTO: 0 /100 WBCS
PLATELET # BLD AUTO: 331 THOUSANDS/UL (ref 149–390)
PMV BLD AUTO: 9.9 FL (ref 8.9–12.7)
POTASSIUM SERPL-SCNC: 4.5 MMOL/L (ref 3.5–5.3)
PROT SERPL-MCNC: 7.6 G/DL (ref 6.4–8.2)
RBC # BLD AUTO: 5.47 MILLION/UL (ref 3.88–5.62)
SODIUM SERPL-SCNC: 136 MMOL/L (ref 136–145)
TRIGL SERPL-MCNC: 72 MG/DL
TSH SERPL DL<=0.05 MIU/L-ACNC: 1.21 UIU/ML (ref 0.36–3.74)
WBC # BLD AUTO: 4.69 THOUSAND/UL (ref 4.31–10.16)

## 2021-03-30 PROCEDURE — 83036 HEMOGLOBIN GLYCOSYLATED A1C: CPT

## 2021-03-30 PROCEDURE — 85025 COMPLETE CBC W/AUTO DIFF WBC: CPT

## 2021-03-30 PROCEDURE — 36415 COLL VENOUS BLD VENIPUNCTURE: CPT

## 2021-03-30 PROCEDURE — 84443 ASSAY THYROID STIM HORMONE: CPT

## 2021-03-30 PROCEDURE — 80061 LIPID PANEL: CPT

## 2021-03-30 PROCEDURE — 82306 VITAMIN D 25 HYDROXY: CPT

## 2021-03-30 PROCEDURE — 80053 COMPREHEN METABOLIC PANEL: CPT

## 2021-03-31 DIAGNOSIS — E78.5 HYPERLIPIDEMIA, UNSPECIFIED HYPERLIPIDEMIA TYPE: Primary | ICD-10-CM

## 2021-03-31 RX ORDER — ATORVASTATIN CALCIUM 10 MG/1
10 TABLET, FILM COATED ORAL DAILY
Qty: 90 TABLET | Refills: 3 | Status: SHIPPED | OUTPATIENT
Start: 2021-03-31 | End: 2022-02-25 | Stop reason: SDUPTHER

## 2021-06-25 DIAGNOSIS — F41.1 GAD (GENERALIZED ANXIETY DISORDER): ICD-10-CM

## 2021-06-25 RX ORDER — ALPRAZOLAM 0.5 MG/1
0.5 TABLET ORAL 2 TIMES DAILY PRN
Qty: 60 TABLET | Refills: 1 | Status: SHIPPED | OUTPATIENT
Start: 2021-06-25 | End: 2021-11-29 | Stop reason: SDUPTHER

## 2021-08-22 DIAGNOSIS — I10 ESSENTIAL HYPERTENSION: ICD-10-CM

## 2021-08-23 RX ORDER — LISINOPRIL 10 MG/1
TABLET ORAL
Qty: 30 TABLET | Refills: 5 | Status: SHIPPED | OUTPATIENT
Start: 2021-08-23 | End: 2021-11-12 | Stop reason: SDUPTHER

## 2021-11-12 ENCOUNTER — OFFICE VISIT (OUTPATIENT)
Dept: INTERNAL MEDICINE CLINIC | Facility: CLINIC | Age: 41
End: 2021-11-12
Payer: COMMERCIAL

## 2021-11-12 ENCOUNTER — APPOINTMENT (OUTPATIENT)
Dept: LAB | Facility: CLINIC | Age: 41
End: 2021-11-12
Payer: COMMERCIAL

## 2021-11-12 VITALS
BODY MASS INDEX: 35.96 KG/M2 | WEIGHT: 229.13 LBS | OXYGEN SATURATION: 98 % | TEMPERATURE: 98.8 F | DIASTOLIC BLOOD PRESSURE: 90 MMHG | HEIGHT: 67 IN | SYSTOLIC BLOOD PRESSURE: 136 MMHG | HEART RATE: 86 BPM

## 2021-11-12 DIAGNOSIS — Z00.00 WELL ADULT EXAM: Primary | ICD-10-CM

## 2021-11-12 DIAGNOSIS — Z01.84 IMMUNITY STATUS TESTING: ICD-10-CM

## 2021-11-12 DIAGNOSIS — A63.0 CONDYLOMA ACUMINATA: ICD-10-CM

## 2021-11-12 DIAGNOSIS — I10 ESSENTIAL HYPERTENSION: ICD-10-CM

## 2021-11-12 PROCEDURE — 86480 TB TEST CELL IMMUN MEASURE: CPT

## 2021-11-12 PROCEDURE — 99395 PREV VISIT EST AGE 18-39: CPT | Performed by: PHYSICIAN ASSISTANT

## 2021-11-12 PROCEDURE — 36415 COLL VENOUS BLD VENIPUNCTURE: CPT

## 2021-11-12 RX ORDER — PODOFILOX 5 MG/ML
SOLUTION TOPICAL 2 TIMES DAILY
Qty: 3.5 ML | Refills: 0 | Status: SHIPPED | OUTPATIENT
Start: 2021-11-12

## 2021-11-12 RX ORDER — LISINOPRIL 20 MG/1
20 TABLET ORAL DAILY
Qty: 30 TABLET | Refills: 2 | Status: SHIPPED | OUTPATIENT
Start: 2021-11-12 | End: 2022-02-25 | Stop reason: SDUPTHER

## 2021-11-15 LAB
GAMMA INTERFERON BACKGROUND BLD IA-ACNC: 0.02 IU/ML
M TB IFN-G BLD-IMP: NEGATIVE
M TB IFN-G CD4+ BCKGRND COR BLD-ACNC: 0 IU/ML
M TB IFN-G CD4+ BCKGRND COR BLD-ACNC: 0 IU/ML
MITOGEN IGNF BCKGRD COR BLD-ACNC: 9.57 IU/ML

## 2021-11-29 DIAGNOSIS — F51.01 PRIMARY INSOMNIA: ICD-10-CM

## 2021-11-29 DIAGNOSIS — F41.1 GAD (GENERALIZED ANXIETY DISORDER): ICD-10-CM

## 2021-11-29 RX ORDER — ALPRAZOLAM 0.5 MG/1
0.5 TABLET ORAL 2 TIMES DAILY PRN
Qty: 60 TABLET | Refills: 1 | Status: SHIPPED | OUTPATIENT
Start: 2021-11-29

## 2021-11-29 RX ORDER — TRAZODONE HYDROCHLORIDE 50 MG/1
50-100 TABLET ORAL
Qty: 60 TABLET | Refills: 4 | Status: SHIPPED | OUTPATIENT
Start: 2021-11-29

## 2022-01-15 ENCOUNTER — OFFICE VISIT (OUTPATIENT)
Dept: INTERNAL MEDICINE CLINIC | Facility: CLINIC | Age: 42
End: 2022-01-15
Payer: COMMERCIAL

## 2022-01-15 VITALS
BODY MASS INDEX: 36.47 KG/M2 | HEIGHT: 67 IN | HEART RATE: 77 BPM | WEIGHT: 232.38 LBS | DIASTOLIC BLOOD PRESSURE: 80 MMHG | OXYGEN SATURATION: 98 % | TEMPERATURE: 98 F | SYSTOLIC BLOOD PRESSURE: 138 MMHG

## 2022-01-15 DIAGNOSIS — Z13.0 SCREENING FOR DEFICIENCY ANEMIA: ICD-10-CM

## 2022-01-15 DIAGNOSIS — I10 ESSENTIAL HYPERTENSION: Primary | ICD-10-CM

## 2022-01-15 DIAGNOSIS — Z13.29 SCREENING FOR THYROID DISORDER: ICD-10-CM

## 2022-01-15 DIAGNOSIS — E78.5 HYPERLIPIDEMIA, UNSPECIFIED HYPERLIPIDEMIA TYPE: ICD-10-CM

## 2022-01-15 DIAGNOSIS — Z13.1 SCREENING FOR DIABETES MELLITUS: ICD-10-CM

## 2022-01-15 DIAGNOSIS — E55.9 VITAMIN D DEFICIENCY: ICD-10-CM

## 2022-01-15 PROCEDURE — 3008F BODY MASS INDEX DOCD: CPT | Performed by: PHYSICIAN ASSISTANT

## 2022-01-15 PROCEDURE — 99213 OFFICE O/P EST LOW 20 MIN: CPT | Performed by: PHYSICIAN ASSISTANT

## 2022-01-15 PROCEDURE — 1036F TOBACCO NON-USER: CPT | Performed by: PHYSICIAN ASSISTANT

## 2022-01-15 NOTE — PROGRESS NOTES
Assessment/Plan:  Problem List Items Addressed This Visit        Cardiovascular and Mediastinum    Essential hypertension - Primary     Pts symptoms are stable with current regime  No changes at present  Other Visit Diagnoses     Hyperlipidemia, unspecified hyperlipidemia type        Relevant Orders    Lipid panel    Screening for diabetes mellitus        Relevant Orders    Comprehensive metabolic panel    Screening for deficiency anemia        Relevant Orders    CBC and differential    Screening for thyroid disorder        Relevant Orders    TSH, 3rd generation with Free T4 reflex    Vitamin D deficiency        Relevant Orders    Vitamin D 25 hydroxy           Diagnoses and all orders for this visit:    Essential hypertension    Hyperlipidemia, unspecified hyperlipidemia type  -     Lipid panel; Future    Screening for diabetes mellitus  -     Comprehensive metabolic panel; Future    Screening for deficiency anemia  -     CBC and differential; Future    Screening for thyroid disorder  -     TSH, 3rd generation with Free T4 reflex; Future    Vitamin D deficiency  -     Vitamin D 25 hydroxy; Future        Essential hypertension  Pts symptoms are stable with current regime  No changes at present  Subjective:      Patient ID: Karyn Zacarias is a 38 y o  male  Pt presents for routine visit  Insomnia has improved with trazodone use  Anxiety remains controlled with xanax prn  His blood pressure is now at goal and he tolerates his medicine well  He will be due for labs in the early spring, orders placed  The following portions of the patient's history were reviewed and updated as appropriate:   He has a past medical history of Hypertension  ,  does not have any pertinent problems on file  ,   has a past surgical history that includes Neuroplasty / transposition median nerve at carpal tunnel and SHOULDER ARTHROPLASTY (Right, 10/2016)  ,  family history includes Cancer in his family; Diabetes in his mother; Hypertension in his father  ,   reports that he has quit smoking  He has never used smokeless tobacco  He reports current alcohol use  He reports that he does not use drugs  ,  has No Known Allergies     Current Outpatient Medications   Medication Sig Dispense Refill    ALPRAZolam (XANAX) 0 5 mg tablet Take 1 tablet (0 5 mg total) by mouth 2 (two) times a day as needed for anxiety 60 tablet 1    atorvastatin (LIPITOR) 10 mg tablet Take 1 tablet (10 mg total) by mouth daily 90 tablet 3    lisinopril (ZESTRIL) 20 mg tablet Take 1 tablet (20 mg total) by mouth daily 30 tablet 2    traZODone (DESYREL) 50 mg tablet Take 1-2 tablets ( mg total) by mouth daily at bedtime 60 tablet 4    podofilox (CONDYLOX) 0 5 % external solution Apply topically 2 (two) times a day (Patient not taking: Reported on 1/15/2022 ) 3 5 mL 0     No current facility-administered medications for this visit  Review of Systems   Constitutional: Negative for chills and fever  HENT: Negative for congestion, ear pain, hearing loss, postnasal drip, rhinorrhea, sinus pressure, sinus pain, sore throat and trouble swallowing  Eyes: Negative for pain and visual disturbance  Respiratory: Negative for cough, chest tightness, shortness of breath and wheezing  Cardiovascular: Negative  Negative for chest pain, palpitations and leg swelling  Gastrointestinal: Negative for abdominal pain, blood in stool, constipation, diarrhea, nausea and vomiting  Endocrine: Negative for cold intolerance, heat intolerance, polydipsia, polyphagia and polyuria  Genitourinary: Negative for difficulty urinating, dysuria, flank pain and urgency  Musculoskeletal: Negative for arthralgias, back pain, gait problem and myalgias  Skin: Negative for rash  Allergic/Immunologic: Negative  Neurological: Negative for dizziness, weakness, light-headedness and headaches  Hematological: Negative      Psychiatric/Behavioral: Negative for behavioral problems, dysphoric mood and sleep disturbance  The patient is not nervous/anxious  PHQ-2/9 Depression Screening            Objective:  Vitals:    01/15/22 1000   BP: 138/80   BP Location: Left arm   Patient Position: Sitting   Pulse: 77   Temp: 98 °F (36 7 °C)   SpO2: 98%   Weight: 105 kg (232 lb 6 oz)   Height: 5' 7" (1 702 m)     Body mass index is 36 4 kg/m²  Physical Exam  Constitutional:       General: He is not in acute distress  Appearance: He is well-developed  He is not diaphoretic  HENT:      Head: Normocephalic and atraumatic  Right Ear: External ear normal       Left Ear: External ear normal       Nose: Nose normal       Mouth/Throat:      Pharynx: No oropharyngeal exudate  Eyes:      General: No scleral icterus  Right eye: No discharge  Left eye: No discharge  Conjunctiva/sclera: Conjunctivae normal       Pupils: Pupils are equal, round, and reactive to light  Neck:      Thyroid: No thyromegaly  Cardiovascular:      Rate and Rhythm: Normal rate and regular rhythm  Heart sounds: Normal heart sounds  No murmur heard  No friction rub  No gallop  Pulmonary:      Effort: Pulmonary effort is normal  No respiratory distress  Breath sounds: Normal breath sounds  No wheezing or rales  Abdominal:      General: Bowel sounds are normal  There is no distension  Palpations: Abdomen is soft  Tenderness: There is no abdominal tenderness  Musculoskeletal:         General: No tenderness or deformity  Normal range of motion  Cervical back: Normal range of motion and neck supple  Skin:     General: Skin is warm and dry  Neurological:      Mental Status: He is alert and oriented to person, place, and time  Cranial Nerves: No cranial nerve deficit  Psychiatric:         Behavior: Behavior normal          Thought Content: Thought content normal          Judgment: Judgment normal        BMI Counseling:  Body mass index is 36 4 kg/m²  The BMI is above normal  Nutrition recommendations include decreasing portion sizes, consuming healthier snacks and limiting drinks that contain sugar  Rationale for BMI follow-up plan is due to patient being overweight or obese

## 2022-02-25 DIAGNOSIS — I10 ESSENTIAL HYPERTENSION: ICD-10-CM

## 2022-02-25 DIAGNOSIS — E78.5 HYPERLIPIDEMIA, UNSPECIFIED HYPERLIPIDEMIA TYPE: ICD-10-CM

## 2022-02-25 RX ORDER — ATORVASTATIN CALCIUM 10 MG/1
10 TABLET, FILM COATED ORAL DAILY
Qty: 90 TABLET | Refills: 3 | Status: SHIPPED | OUTPATIENT
Start: 2022-02-25 | End: 2022-06-13 | Stop reason: SDUPTHER

## 2022-02-25 RX ORDER — LISINOPRIL 20 MG/1
20 TABLET ORAL DAILY
Qty: 30 TABLET | Refills: 2 | Status: SHIPPED | OUTPATIENT
Start: 2022-02-25 | End: 2022-06-10

## 2022-06-10 DIAGNOSIS — I10 ESSENTIAL HYPERTENSION: ICD-10-CM

## 2022-06-10 RX ORDER — LISINOPRIL 20 MG/1
TABLET ORAL
Qty: 30 TABLET | Refills: 2 | Status: SHIPPED | OUTPATIENT
Start: 2022-06-10 | End: 2022-06-13 | Stop reason: SDUPTHER

## 2022-06-13 DIAGNOSIS — E78.5 HYPERLIPIDEMIA, UNSPECIFIED HYPERLIPIDEMIA TYPE: ICD-10-CM

## 2022-06-13 DIAGNOSIS — I10 ESSENTIAL HYPERTENSION: ICD-10-CM

## 2022-06-13 RX ORDER — LISINOPRIL 20 MG/1
20 TABLET ORAL DAILY
Qty: 30 TABLET | Refills: 2 | Status: SHIPPED | OUTPATIENT
Start: 2022-06-13

## 2022-06-13 RX ORDER — ATORVASTATIN CALCIUM 10 MG/1
10 TABLET, FILM COATED ORAL DAILY
Qty: 90 TABLET | Refills: 3 | Status: SHIPPED | OUTPATIENT
Start: 2022-06-13 | End: 2022-06-13 | Stop reason: SDUPTHER

## 2022-06-13 RX ORDER — ATORVASTATIN CALCIUM 10 MG/1
10 TABLET, FILM COATED ORAL DAILY
Qty: 90 TABLET | Refills: 3 | Status: SHIPPED | OUTPATIENT
Start: 2022-06-13

## 2022-06-13 RX ORDER — LISINOPRIL 20 MG/1
20 TABLET ORAL DAILY
Qty: 30 TABLET | Refills: 2 | Status: SHIPPED | OUTPATIENT
Start: 2022-06-13 | End: 2022-06-13 | Stop reason: SDUPTHER

## 2022-09-02 ENCOUNTER — APPOINTMENT (OUTPATIENT)
Dept: LAB | Facility: CLINIC | Age: 42
End: 2022-09-02
Payer: COMMERCIAL

## 2022-09-02 DIAGNOSIS — E78.5 HYPERLIPIDEMIA, UNSPECIFIED HYPERLIPIDEMIA TYPE: ICD-10-CM

## 2022-09-02 DIAGNOSIS — Z13.0 SCREENING FOR DEFICIENCY ANEMIA: ICD-10-CM

## 2022-09-02 DIAGNOSIS — E55.9 VITAMIN D DEFICIENCY: ICD-10-CM

## 2022-09-02 DIAGNOSIS — Z13.1 SCREENING FOR DIABETES MELLITUS: ICD-10-CM

## 2022-09-02 DIAGNOSIS — Z13.29 SCREENING FOR THYROID DISORDER: ICD-10-CM

## 2022-09-02 LAB
25(OH)D3 SERPL-MCNC: 33.7 NG/ML (ref 30–100)
ALBUMIN SERPL BCP-MCNC: 4.4 G/DL (ref 3.5–5)
ALP SERPL-CCNC: 41 U/L (ref 46–116)
ALT SERPL W P-5'-P-CCNC: 59 U/L (ref 12–78)
ANION GAP SERPL CALCULATED.3IONS-SCNC: 7 MMOL/L (ref 4–13)
AST SERPL W P-5'-P-CCNC: 23 U/L (ref 5–45)
BASOPHILS # BLD AUTO: 0.05 THOUSANDS/ΜL (ref 0–0.1)
BASOPHILS NFR BLD AUTO: 1 % (ref 0–1)
BILIRUB SERPL-MCNC: 1.36 MG/DL (ref 0.2–1)
BUN SERPL-MCNC: 16 MG/DL (ref 5–25)
CALCIUM SERPL-MCNC: 9.6 MG/DL (ref 8.3–10.1)
CHLORIDE SERPL-SCNC: 104 MMOL/L (ref 96–108)
CHOLEST SERPL-MCNC: 159 MG/DL
CO2 SERPL-SCNC: 25 MMOL/L (ref 21–32)
CREAT SERPL-MCNC: 1.22 MG/DL (ref 0.6–1.3)
EOSINOPHIL # BLD AUTO: 0.24 THOUSAND/ΜL (ref 0–0.61)
EOSINOPHIL NFR BLD AUTO: 3 % (ref 0–6)
ERYTHROCYTE [DISTWIDTH] IN BLOOD BY AUTOMATED COUNT: 13.2 % (ref 11.6–15.1)
GFR SERPL CREATININE-BSD FRML MDRD: 73 ML/MIN/1.73SQ M
GLUCOSE P FAST SERPL-MCNC: 94 MG/DL (ref 65–99)
HCT VFR BLD AUTO: 50.3 % (ref 36.5–49.3)
HDLC SERPL-MCNC: 43 MG/DL
HGB BLD-MCNC: 17.1 G/DL (ref 12–17)
IMM GRANULOCYTES # BLD AUTO: 0.03 THOUSAND/UL (ref 0–0.2)
IMM GRANULOCYTES NFR BLD AUTO: 0 % (ref 0–2)
LDLC SERPL CALC-MCNC: 97 MG/DL (ref 0–100)
LYMPHOCYTES # BLD AUTO: 1.94 THOUSANDS/ΜL (ref 0.6–4.47)
LYMPHOCYTES NFR BLD AUTO: 28 % (ref 14–44)
MCH RBC QN AUTO: 29.8 PG (ref 26.8–34.3)
MCHC RBC AUTO-ENTMCNC: 34 G/DL (ref 31.4–37.4)
MCV RBC AUTO: 88 FL (ref 82–98)
MONOCYTES # BLD AUTO: 0.68 THOUSAND/ΜL (ref 0.17–1.22)
MONOCYTES NFR BLD AUTO: 10 % (ref 4–12)
NEUTROPHILS # BLD AUTO: 4.1 THOUSANDS/ΜL (ref 1.85–7.62)
NEUTS SEG NFR BLD AUTO: 58 % (ref 43–75)
NONHDLC SERPL-MCNC: 116 MG/DL
NRBC BLD AUTO-RTO: 0 /100 WBCS
PLATELET # BLD AUTO: 278 THOUSANDS/UL (ref 149–390)
PMV BLD AUTO: 10 FL (ref 8.9–12.7)
POTASSIUM SERPL-SCNC: 4.4 MMOL/L (ref 3.5–5.3)
PROT SERPL-MCNC: 7.9 G/DL (ref 6.4–8.4)
RBC # BLD AUTO: 5.74 MILLION/UL (ref 3.88–5.62)
SODIUM SERPL-SCNC: 136 MMOL/L (ref 135–147)
TRIGL SERPL-MCNC: 94 MG/DL
TSH SERPL DL<=0.05 MIU/L-ACNC: 1.32 UIU/ML (ref 0.45–4.5)
WBC # BLD AUTO: 7.04 THOUSAND/UL (ref 4.31–10.16)

## 2022-09-02 PROCEDURE — 80053 COMPREHEN METABOLIC PANEL: CPT

## 2022-09-02 PROCEDURE — 36415 COLL VENOUS BLD VENIPUNCTURE: CPT

## 2022-09-02 PROCEDURE — 80061 LIPID PANEL: CPT

## 2022-09-02 PROCEDURE — 82306 VITAMIN D 25 HYDROXY: CPT

## 2022-09-02 PROCEDURE — 84443 ASSAY THYROID STIM HORMONE: CPT

## 2022-09-02 PROCEDURE — 85025 COMPLETE CBC W/AUTO DIFF WBC: CPT

## 2022-09-06 DIAGNOSIS — I10 ESSENTIAL HYPERTENSION: ICD-10-CM

## 2022-09-06 DIAGNOSIS — F51.01 PRIMARY INSOMNIA: ICD-10-CM

## 2022-09-06 DIAGNOSIS — E78.5 HYPERLIPIDEMIA, UNSPECIFIED HYPERLIPIDEMIA TYPE: ICD-10-CM

## 2022-09-06 RX ORDER — LISINOPRIL 20 MG/1
20 TABLET ORAL DAILY
Qty: 30 TABLET | Refills: 2 | Status: SHIPPED | OUTPATIENT
Start: 2022-09-06

## 2022-09-06 RX ORDER — ATORVASTATIN CALCIUM 10 MG/1
10 TABLET, FILM COATED ORAL DAILY
Qty: 90 TABLET | Refills: 3 | Status: SHIPPED | OUTPATIENT
Start: 2022-09-06

## 2022-09-06 RX ORDER — TRAZODONE HYDROCHLORIDE 50 MG/1
50-100 TABLET ORAL
Qty: 60 TABLET | Refills: 4 | Status: SHIPPED | OUTPATIENT
Start: 2022-09-06

## 2023-01-12 DIAGNOSIS — I10 ESSENTIAL HYPERTENSION: ICD-10-CM

## 2023-01-13 RX ORDER — LISINOPRIL 20 MG/1
TABLET ORAL
Qty: 30 TABLET | Refills: 2 | Status: SHIPPED | OUTPATIENT
Start: 2023-01-13

## 2023-04-02 DIAGNOSIS — F51.01 PRIMARY INSOMNIA: ICD-10-CM

## 2023-04-03 RX ORDER — TRAZODONE HYDROCHLORIDE 50 MG/1
TABLET ORAL
Qty: 60 TABLET | Refills: 4 | Status: SHIPPED | OUTPATIENT
Start: 2023-04-03

## 2023-05-07 DIAGNOSIS — I10 ESSENTIAL HYPERTENSION: ICD-10-CM

## 2023-05-08 RX ORDER — LISINOPRIL 20 MG/1
TABLET ORAL
Qty: 30 TABLET | Refills: 2 | Status: SHIPPED | OUTPATIENT
Start: 2023-05-08

## 2023-08-23 DIAGNOSIS — I10 ESSENTIAL HYPERTENSION: ICD-10-CM

## 2023-08-24 RX ORDER — LISINOPRIL 20 MG/1
TABLET ORAL
Qty: 30 TABLET | Refills: 2 | Status: SHIPPED | OUTPATIENT
Start: 2023-08-24

## 2023-08-30 DIAGNOSIS — F51.01 PRIMARY INSOMNIA: ICD-10-CM

## 2023-08-31 RX ORDER — TRAZODONE HYDROCHLORIDE 50 MG/1
TABLET ORAL
Qty: 60 TABLET | Refills: 4 | Status: SHIPPED | OUTPATIENT
Start: 2023-08-31

## 2023-11-29 DIAGNOSIS — I10 ESSENTIAL HYPERTENSION: ICD-10-CM

## 2023-11-29 RX ORDER — LISINOPRIL 20 MG/1
TABLET ORAL
Qty: 30 TABLET | Refills: 2 | Status: SHIPPED | OUTPATIENT
Start: 2023-11-29

## 2024-01-03 DIAGNOSIS — Z13.0 SCREENING FOR DEFICIENCY ANEMIA: ICD-10-CM

## 2024-01-03 DIAGNOSIS — Z13.29 SCREENING FOR THYROID DISORDER: ICD-10-CM

## 2024-01-03 DIAGNOSIS — F51.01 PRIMARY INSOMNIA: ICD-10-CM

## 2024-01-03 DIAGNOSIS — E78.5 HYPERLIPIDEMIA, UNSPECIFIED HYPERLIPIDEMIA TYPE: ICD-10-CM

## 2024-01-03 DIAGNOSIS — I10 ESSENTIAL HYPERTENSION: ICD-10-CM

## 2024-01-03 DIAGNOSIS — E55.9 VITAMIN D DEFICIENCY: Primary | ICD-10-CM

## 2024-01-03 RX ORDER — TRAZODONE HYDROCHLORIDE 50 MG/1
50-100 TABLET ORAL
Qty: 60 TABLET | Refills: 3 | Status: SHIPPED | OUTPATIENT
Start: 2024-01-03

## 2024-01-03 RX ORDER — ATORVASTATIN CALCIUM 10 MG/1
10 TABLET, FILM COATED ORAL DAILY
Qty: 30 TABLET | Refills: 3 | Status: SHIPPED | OUTPATIENT
Start: 2024-01-03

## 2024-01-03 RX ORDER — LISINOPRIL 20 MG/1
20 TABLET ORAL DAILY
Qty: 30 TABLET | Refills: 3 | Status: SHIPPED | OUTPATIENT
Start: 2024-01-03

## 2024-04-01 ENCOUNTER — LAB (OUTPATIENT)
Dept: LAB | Facility: CLINIC | Age: 44
End: 2024-04-01
Payer: COMMERCIAL

## 2024-04-01 DIAGNOSIS — E55.9 VITAMIN D DEFICIENCY: ICD-10-CM

## 2024-04-01 DIAGNOSIS — Z13.0 SCREENING FOR DEFICIENCY ANEMIA: ICD-10-CM

## 2024-04-01 DIAGNOSIS — I10 ESSENTIAL HYPERTENSION: ICD-10-CM

## 2024-04-01 DIAGNOSIS — Z13.29 SCREENING FOR THYROID DISORDER: ICD-10-CM

## 2024-04-01 DIAGNOSIS — E78.5 HYPERLIPIDEMIA, UNSPECIFIED HYPERLIPIDEMIA TYPE: ICD-10-CM

## 2024-04-01 LAB
25(OH)D3 SERPL-MCNC: 53.3 NG/ML (ref 30–100)
ALBUMIN SERPL BCP-MCNC: 4.5 G/DL (ref 3.5–5)
ALP SERPL-CCNC: 42 U/L (ref 34–104)
ALT SERPL W P-5'-P-CCNC: 39 U/L (ref 7–52)
ANION GAP SERPL CALCULATED.3IONS-SCNC: 9 MMOL/L (ref 4–13)
AST SERPL W P-5'-P-CCNC: 23 U/L (ref 13–39)
BASOPHILS # BLD AUTO: 0.04 THOUSANDS/ÂΜL (ref 0–0.1)
BASOPHILS NFR BLD AUTO: 1 % (ref 0–1)
BILIRUB SERPL-MCNC: 1.04 MG/DL (ref 0.2–1)
BUN SERPL-MCNC: 14 MG/DL (ref 5–25)
CALCIUM SERPL-MCNC: 9 MG/DL (ref 8.4–10.2)
CHLORIDE SERPL-SCNC: 101 MMOL/L (ref 96–108)
CHOLEST SERPL-MCNC: 137 MG/DL
CO2 SERPL-SCNC: 28 MMOL/L (ref 21–32)
CREAT SERPL-MCNC: 1.03 MG/DL (ref 0.6–1.3)
EOSINOPHIL # BLD AUTO: 0.11 THOUSAND/ÂΜL (ref 0–0.61)
EOSINOPHIL NFR BLD AUTO: 2 % (ref 0–6)
ERYTHROCYTE [DISTWIDTH] IN BLOOD BY AUTOMATED COUNT: 13.3 % (ref 11.6–15.1)
GFR SERPL CREATININE-BSD FRML MDRD: 89 ML/MIN/1.73SQ M
GLUCOSE P FAST SERPL-MCNC: 105 MG/DL (ref 65–99)
HCT VFR BLD AUTO: 50.1 % (ref 36.5–49.3)
HDLC SERPL-MCNC: 45 MG/DL
HGB BLD-MCNC: 16.2 G/DL (ref 12–17)
IMM GRANULOCYTES # BLD AUTO: 0.04 THOUSAND/UL (ref 0–0.2)
IMM GRANULOCYTES NFR BLD AUTO: 1 % (ref 0–2)
LDLC SERPL CALC-MCNC: 70 MG/DL (ref 0–100)
LYMPHOCYTES # BLD AUTO: 1.67 THOUSANDS/ÂΜL (ref 0.6–4.47)
LYMPHOCYTES NFR BLD AUTO: 30 % (ref 14–44)
MCH RBC QN AUTO: 29 PG (ref 26.8–34.3)
MCHC RBC AUTO-ENTMCNC: 32.3 G/DL (ref 31.4–37.4)
MCV RBC AUTO: 90 FL (ref 82–98)
MONOCYTES # BLD AUTO: 0.61 THOUSAND/ÂΜL (ref 0.17–1.22)
MONOCYTES NFR BLD AUTO: 11 % (ref 4–12)
NEUTROPHILS # BLD AUTO: 3.11 THOUSANDS/ÂΜL (ref 1.85–7.62)
NEUTS SEG NFR BLD AUTO: 55 % (ref 43–75)
NONHDLC SERPL-MCNC: 92 MG/DL
NRBC BLD AUTO-RTO: 0 /100 WBCS
PLATELET # BLD AUTO: 305 THOUSANDS/UL (ref 149–390)
PMV BLD AUTO: 9.9 FL (ref 8.9–12.7)
POTASSIUM SERPL-SCNC: 4.8 MMOL/L (ref 3.5–5.3)
PROT SERPL-MCNC: 7.3 G/DL (ref 6.4–8.4)
RBC # BLD AUTO: 5.59 MILLION/UL (ref 3.88–5.62)
SODIUM SERPL-SCNC: 138 MMOL/L (ref 135–147)
TRIGL SERPL-MCNC: 112 MG/DL
TSH SERPL DL<=0.05 MIU/L-ACNC: 1.56 UIU/ML (ref 0.45–4.5)
WBC # BLD AUTO: 5.58 THOUSAND/UL (ref 4.31–10.16)

## 2024-04-01 PROCEDURE — 80053 COMPREHEN METABOLIC PANEL: CPT

## 2024-04-01 PROCEDURE — 85025 COMPLETE CBC W/AUTO DIFF WBC: CPT

## 2024-04-01 PROCEDURE — 80061 LIPID PANEL: CPT

## 2024-04-01 PROCEDURE — 36415 COLL VENOUS BLD VENIPUNCTURE: CPT

## 2024-04-01 PROCEDURE — 82306 VITAMIN D 25 HYDROXY: CPT

## 2024-04-01 PROCEDURE — 84443 ASSAY THYROID STIM HORMONE: CPT

## 2024-04-02 NOTE — RESULT ENCOUNTER NOTE
Alejandro Bauman  I received and reviewed your recent labs and everything looked good. Please reach out with any specific questions or concerns. Have a great day  -Mica

## 2024-04-12 ENCOUNTER — OFFICE VISIT (OUTPATIENT)
Dept: INTERNAL MEDICINE CLINIC | Facility: CLINIC | Age: 44
End: 2024-04-12
Payer: COMMERCIAL

## 2024-04-12 VITALS
HEIGHT: 67 IN | BODY MASS INDEX: 37.98 KG/M2 | OXYGEN SATURATION: 97 % | WEIGHT: 242 LBS | HEART RATE: 93 BPM | TEMPERATURE: 98.1 F | SYSTOLIC BLOOD PRESSURE: 136 MMHG | DIASTOLIC BLOOD PRESSURE: 82 MMHG

## 2024-04-12 DIAGNOSIS — A63.0 CONDYLOMA ACUMINATA: ICD-10-CM

## 2024-04-12 DIAGNOSIS — E66.9 OBESITY (BMI 30.0-34.9): ICD-10-CM

## 2024-04-12 DIAGNOSIS — Z11.59 NEED FOR HEPATITIS C SCREENING TEST: ICD-10-CM

## 2024-04-12 DIAGNOSIS — J30.2 SEASONAL ALLERGIES: ICD-10-CM

## 2024-04-12 DIAGNOSIS — Z00.00 WELL ADULT EXAM: Primary | ICD-10-CM

## 2024-04-12 DIAGNOSIS — Z11.4 SCREENING FOR HIV (HUMAN IMMUNODEFICIENCY VIRUS): ICD-10-CM

## 2024-04-12 PROCEDURE — 99396 PREV VISIT EST AGE 40-64: CPT | Performed by: PHYSICIAN ASSISTANT

## 2024-04-12 RX ORDER — PODOFILOX 5 MG/ML
SOLUTION TOPICAL 2 TIMES DAILY
Qty: 3.5 ML | Refills: 0 | Status: SHIPPED | OUTPATIENT
Start: 2024-04-12

## 2024-04-12 RX ORDER — FLUTICASONE PROPIONATE 50 MCG
1 SPRAY, SUSPENSION (ML) NASAL DAILY
Qty: 16 G | Refills: 1 | Status: SHIPPED | OUTPATIENT
Start: 2024-04-12

## 2024-04-12 RX ORDER — PHENTERMINE HYDROCHLORIDE 37.5 MG/1
37.5 CAPSULE ORAL EVERY MORNING
Qty: 30 CAPSULE | Refills: 2 | Status: SHIPPED | OUTPATIENT
Start: 2024-04-12

## 2024-04-15 NOTE — PROGRESS NOTES
ADULT ANNUAL PHYSICAL  [facility]    NAME: Josué Peters  AGE: 44 y.o. SEX: male  : 1980     DATE: 4/15/2024     Assessment and Plan:     Problem List Items Addressed This Visit       Obesity (BMI 30.0-34.9)    Relevant Medications    phentermine 37.5 MG capsule     Other Visit Diagnoses       Need for hepatitis C screening test    -  Primary    Screening for HIV (human immunodeficiency virus)        Condyloma acuminata        Relevant Medications    podofilox (CONDYLOX) 0.5 % external solution    Seasonal allergies        Relevant Medications    fluticasone (FLONASE) 50 mcg/act nasal spray            Immunizations and preventive care screenings were discussed with patient today. Appropriate education was printed on patient's after visit summary.    Discussed risks and benefits of prostate cancer screening. We discussed the controversial history of PSA screening for prostate cancer in the [place] as well as the risk of over detection and over treatment of prostate cancer by way of PSA screening.  The patient understands that PSA blood testing is an imperfect way to screen for prostate cancer and that elevated PSA levels in the blood may also be caused by infection, inflammation, prostatic trauma or manipulation, urological procedures, or by benign prostatic enlargement.    The role of the digital rectal examination in prostate cancer screening was also discussed and I discussed with him that there is large interobserver variability in the findings of digital rectal examination.    Counseling:  Labs up to date.       Depression Screening and Follow-up Plan: Patient was screened for depression during today's encounter. They screened negative with a PHQ-2 score of 0.        Return in about 1 year (around 2025).     Chief Complaint:     Chief Complaint   Patient presents with    Physical Exam     No questions or concerns      History of  Present Illness:     Adult Annual Physical   Patient here for a comprehensive physical exam. The patient reports problems - Frustrated with weight, would like to revisit phentermine.  .    Diet and Physical Activity  Diet/Nutrition: poor diet.   Exercise: walking.      Depression Screening  PHQ-2/9 Depression Screening    Little interest or pleasure in doing things: 0 - not at all  Feeling down, depressed, or hopeless: 0 - not at all  PHQ-2 Score: 0  PHQ-2 Interpretation: Negative depression screen       General Health  Sleep: sleeps well.   Hearing: normal - bilateral.  Vision: wears glasses.   Dental: regular dental visits.        Health  Symptoms include: none    Advanced Care Planning  Do you have an advanced directive? no  Do you have a durable medical power of ? no  ACP document given to patient? no     Review of Systems:     Review of Systems   Constitutional:  Negative for chills and fever.   HENT:  Negative for congestion, ear pain, hearing loss, postnasal drip, rhinorrhea, sinus pressure, sinus pain, sore throat and trouble swallowing.    Eyes:  Negative for pain and visual disturbance.   Respiratory:  Negative for cough, chest tightness, shortness of breath and wheezing.    Cardiovascular: Negative.  Negative for chest pain, palpitations and leg swelling.   Gastrointestinal:  Negative for abdominal pain, blood in stool, constipation, diarrhea, nausea and vomiting.   Endocrine: Negative for cold intolerance, heat intolerance, polydipsia, polyphagia and polyuria.   Genitourinary:  Negative for difficulty urinating, dysuria, flank pain and urgency.   Musculoskeletal:  Negative for arthralgias, back pain, gait problem and myalgias.   Skin:  Negative for rash.   Allergic/Immunologic: Negative.    Neurological:  Negative for dizziness, weakness, light-headedness and headaches.   Hematological: Negative.    Psychiatric/Behavioral:  Negative for behavioral problems, dysphoric mood and sleep disturbance.  The patient is not nervous/anxious.       Past Medical History:     Past Medical History:   Diagnosis Date    Hypertension       Past Surgical History:     Past Surgical History:   Procedure Laterality Date    NEUROPLASTY / TRANSPOSITION MEDIAN NERVE AT CARPAL TUNNEL      SHOULDER ARTHROPLASTY Right 10/2016      Family History:     Family History   Problem Relation Age of Onset    Diabetes Mother     Cancer Family     Hypertension Father       Social History:     Social History     Socioeconomic History    Marital status: /Civil Union     Spouse name: None    Number of children: None    Years of education: None    Highest education level: None   Occupational History    Occupation: EMPLOYED   Tobacco Use    Smoking status: Former     Passive exposure: Never    Smokeless tobacco: Never   Vaping Use    Vaping status: Never Used   Substance and Sexual Activity    Alcohol use: Yes     Comment: Occas     Drug use: No    Sexual activity: None   Other Topics Concern    None   Social History Narrative    Uses seatbelts    Caffeine use        Employed- Full Time- [employer]    Lives at home with spouse and 2 children      Social Determinants of Health     Financial Resource Strain: Not on file   Food Insecurity: Not on file   Transportation Needs: Not on file   Physical Activity: Not on file   Stress: Not on file   Social Connections: Not on file   Intimate Partner Violence: Not on file   Housing Stability: Not on file      Current Medications:     Current Outpatient Medications   Medication Sig Dispense Refill    atorvastatin (LIPITOR) 10 mg tablet Take 1 tablet (10 mg total) by mouth daily 30 tablet 3    fluticasone (FLONASE) 50 mcg/act nasal spray 1 spray into each nostril daily 16 g 1    lisinopril (ZESTRIL) 20 mg tablet Take 1 tablet (20 mg total) by mouth daily 30 tablet 3    phentermine 37.5 MG capsule Take 1 capsule (37.5 mg total) by mouth every morning 30 capsule 2    podofilox (CONDYLOX) 0.5  "% external solution Apply topically 2 (two) times a day 3.5 mL 0    traZODone (DESYREL) 50 mg tablet Take 1-2 tablets ( mg total) by mouth daily at bedtime 60 tablet 3    ALPRAZolam (XANAX) 0.5 mg tablet Take 1 tablet (0.5 mg total) by mouth 2 (two) times a day as needed for anxiety (Patient not taking: Reported on 4/12/2024) 60 tablet 1     No current facility-administered medications for this visit.      Allergies:     No Known Allergies   Physical Exam:     /82 (BP Location: Left arm, Patient Position: Sitting)   Pulse 93   Temp 98.1 °F (36.7 °C) (Tympanic)   Ht 5' 7\" (1.702 m)   Wt 110 kg (242 lb)   SpO2 97%   BMI 37.90 kg/m²     Physical Exam  Vitals and nursing note reviewed.   Constitutional:       Appearance: He is well-developed.   HENT:      Head: Normocephalic and atraumatic.      Right Ear: External ear normal.      Left Ear: External ear normal.      Nose: Nose normal.   Eyes:      Conjunctiva/sclera: Conjunctivae normal.      Pupils: Pupils are equal, round, and reactive to light.   Cardiovascular:      Rate and Rhythm: Normal rate and regular rhythm.      Heart sounds: Normal heart sounds.   Pulmonary:      Breath sounds: Normal breath sounds.   Abdominal:      General: Bowel sounds are normal.      Palpations: Abdomen is soft.   Musculoskeletal:         General: Normal range of motion.      Cervical back: Normal range of motion and neck supple.   Skin:     General: Skin is warm and dry.   Neurological:      Mental Status: He is alert and oriented to person, place, and time.   Psychiatric:         Behavior: Behavior normal.         Thought Content: Thought content normal.         Judgment: Judgment normal.          Mica Verduzco PA-C  [facility]    "

## 2024-04-29 DIAGNOSIS — I10 ESSENTIAL HYPERTENSION: ICD-10-CM

## 2024-04-29 DIAGNOSIS — E78.5 HYPERLIPIDEMIA, UNSPECIFIED HYPERLIPIDEMIA TYPE: ICD-10-CM

## 2024-04-30 RX ORDER — LISINOPRIL 20 MG/1
20 TABLET ORAL DAILY
Qty: 30 TABLET | Refills: 5 | Status: SHIPPED | OUTPATIENT
Start: 2024-04-30

## 2024-04-30 RX ORDER — ATORVASTATIN CALCIUM 10 MG/1
10 TABLET, FILM COATED ORAL DAILY
Qty: 30 TABLET | Refills: 5 | Status: SHIPPED | OUTPATIENT
Start: 2024-04-30

## 2024-05-15 DIAGNOSIS — F51.01 PRIMARY INSOMNIA: ICD-10-CM

## 2024-05-15 RX ORDER — TRAZODONE HYDROCHLORIDE 50 MG/1
50-100 TABLET ORAL
Qty: 60 TABLET | Refills: 3 | Status: SHIPPED | OUTPATIENT
Start: 2024-05-15

## 2024-06-08 ENCOUNTER — DOCUMENTATION (OUTPATIENT)
Dept: INTERNAL MEDICINE CLINIC | Facility: CLINIC | Age: 44
End: 2024-06-08

## 2024-06-08 DIAGNOSIS — B36.0 TINEA VERSICOLOR: Primary | ICD-10-CM

## 2024-06-08 RX ORDER — KETOCONAZOLE 20 MG/G
CREAM TOPICAL DAILY
Qty: 30 G | Refills: 1 | Status: SHIPPED | OUTPATIENT
Start: 2024-06-08

## 2024-09-13 DIAGNOSIS — F51.01 PRIMARY INSOMNIA: ICD-10-CM

## 2024-09-13 RX ORDER — TRAZODONE HYDROCHLORIDE 50 MG/1
50-100 TABLET, FILM COATED ORAL
Qty: 60 TABLET | Refills: 5 | Status: SHIPPED | OUTPATIENT
Start: 2024-09-13

## 2024-11-02 DIAGNOSIS — I10 ESSENTIAL HYPERTENSION: ICD-10-CM

## 2024-11-02 DIAGNOSIS — E78.5 HYPERLIPIDEMIA, UNSPECIFIED HYPERLIPIDEMIA TYPE: ICD-10-CM

## 2024-11-04 RX ORDER — LISINOPRIL 20 MG/1
20 TABLET ORAL DAILY
Qty: 30 TABLET | Refills: 5 | Status: SHIPPED | OUTPATIENT
Start: 2024-11-04

## 2024-11-04 RX ORDER — ATORVASTATIN CALCIUM 10 MG/1
10 TABLET, FILM COATED ORAL DAILY
Qty: 30 TABLET | Refills: 5 | Status: SHIPPED | OUTPATIENT
Start: 2024-11-04

## 2025-03-17 DIAGNOSIS — F51.01 PRIMARY INSOMNIA: ICD-10-CM

## 2025-03-18 RX ORDER — TRAZODONE HYDROCHLORIDE 50 MG/1
50-100 TABLET ORAL
Qty: 60 TABLET | Refills: 0 | Status: SHIPPED | OUTPATIENT
Start: 2025-03-18

## 2025-04-12 DIAGNOSIS — F51.01 PRIMARY INSOMNIA: ICD-10-CM

## 2025-04-14 RX ORDER — TRAZODONE HYDROCHLORIDE 50 MG/1
50-100 TABLET ORAL
Qty: 60 TABLET | Refills: 0 | OUTPATIENT
Start: 2025-04-14

## 2025-05-30 DIAGNOSIS — I10 ESSENTIAL HYPERTENSION: ICD-10-CM

## 2025-06-02 RX ORDER — LISINOPRIL 20 MG/1
20 TABLET ORAL DAILY
Qty: 30 TABLET | Refills: 0 | OUTPATIENT
Start: 2025-06-02